# Patient Record
Sex: MALE | Race: WHITE | NOT HISPANIC OR LATINO | URBAN - METROPOLITAN AREA
[De-identification: names, ages, dates, MRNs, and addresses within clinical notes are randomized per-mention and may not be internally consistent; named-entity substitution may affect disease eponyms.]

---

## 2018-04-10 ENCOUNTER — RECORDS - HEALTHEAST (OUTPATIENT)
Dept: LAB | Facility: CLINIC | Age: 58
End: 2018-04-10

## 2018-04-10 ENCOUNTER — TRANSFERRED RECORDS (OUTPATIENT)
Dept: HEALTH INFORMATION MANAGEMENT | Facility: CLINIC | Age: 58
End: 2018-04-10

## 2018-04-10 LAB
CHOLEST SERPL-MCNC: 230 MG/DL
ERYTHROCYTE [SEDIMENTATION RATE] IN BLOOD BY WESTERGREN METHOD: 5 MM/HR (ref 0–15)
FASTING STATUS PATIENT QL REPORTED: ABNORMAL
HDLC SERPL-MCNC: 58 MG/DL
LDLC SERPL CALC-MCNC: 148 MG/DL
RHEUMATOID FACT SERPL-ACNC: <15 IU/ML (ref 0–30)
TRIGL SERPL-MCNC: 121 MG/DL
URATE SERPL-MCNC: 6.6 MG/DL (ref 3–8)

## 2018-04-11 LAB — B BURGDOR IGG+IGM SER QL: 0.02 INDEX VALUE

## 2018-04-12 LAB — ANA SER QL: 0.6 U

## 2018-08-10 ENCOUNTER — RECORDS - HEALTHEAST (OUTPATIENT)
Dept: LAB | Facility: CLINIC | Age: 58
End: 2018-08-10

## 2018-08-10 ENCOUNTER — TRANSFERRED RECORDS (OUTPATIENT)
Dept: HEALTH INFORMATION MANAGEMENT | Facility: CLINIC | Age: 58
End: 2018-08-10

## 2018-08-10 LAB
ALBUMIN SERPL-MCNC: 3.9 G/DL (ref 3.5–5)
ANION GAP SERPL CALCULATED.3IONS-SCNC: 10 MMOL/L (ref 5–18)
BUN SERPL-MCNC: 20 MG/DL (ref 8–22)
CALCIUM SERPL-MCNC: 9.4 MG/DL (ref 8.5–10.5)
CHLORIDE BLD-SCNC: 108 MMOL/L (ref 98–107)
CO2 SERPL-SCNC: 25 MMOL/L (ref 22–31)
CREAT SERPL-MCNC: 0.91 MG/DL (ref 0.7–1.3)
GFR SERPL CREATININE-BSD FRML MDRD: >60 ML/MIN/1.73M2
GLUCOSE BLD-MCNC: 96 MG/DL (ref 70–125)
PHOSPHATE SERPL-MCNC: 3.4 MG/DL (ref 2.5–4.5)
POTASSIUM BLD-SCNC: 4.4 MMOL/L (ref 3.5–5)
SODIUM SERPL-SCNC: 143 MMOL/L (ref 136–145)

## 2018-09-10 ENCOUNTER — RECORDS - HEALTHEAST (OUTPATIENT)
Dept: LAB | Facility: CLINIC | Age: 58
End: 2018-09-10

## 2018-09-10 LAB
C DIFF TOX B STL QL: NEGATIVE
RIBOTYPE 027/NAP1/BI: NORMAL

## 2018-09-11 LAB
O+P STL MICRO: NORMAL
SHIGA TOXIN 1: NEGATIVE
SHIGA TOXIN 2: NEGATIVE

## 2018-09-13 LAB — BACTERIA SPEC CULT: NORMAL

## 2018-10-08 ENCOUNTER — TELEPHONE (OUTPATIENT)
Dept: RHEUMATOLOGY | Facility: CLINIC | Age: 58
End: 2018-10-08

## 2018-10-08 NOTE — TELEPHONE ENCOUNTER
Health Call Center    Phone Message    May a detailed message be left on voicemail: no    Reason for Call:  See information below for online appointment request. PT DX of rheumatoid synovitis, tenosinovitis. Clinic will be facing all records regarding DX to clinic    REFERRAL REQUEST  Submitted Monday October 8th, 2018 @ 3:01 pm    REFERRING PHYSICIAN INFORMATION  Consult or referral?: Referral  Referring physicians name: Quinn Bruno  [facility] name: [facility]  UPIN: [empty]  Clinic address: [address] [place] [address] city: [facility] state: [place]  Clinic zip code: [address]  Clinic phone #: [phone]  Clinic fax #: [phone]  Clinic contact name: Aiyana  Clinic contact's direct #: [phone]    REQUESTED APPOINTMENT  Reason for appointment: rheumatoid synovitis, tenosinovitis  Speciality requested: rheumatology  Pertinent prior surgery or testing: [empty]  Onset / Duration: [empty]  Physician requested (if any): Dr Hasmukh Quiroga    Action Taken: Message routed to:  [facility] ([facility]): Rheumatology

## 2018-10-30 NOTE — TELEPHONE ENCOUNTER
Records have not been received. Spoke with Aliza at the medical records department who will fax the records to us.     General Rheumatology Intake Form    Reason for referral: 2nd opinion for RA  Referring provider: Quinn Bruno at [facility]    Past Rheumatologist: Yes  Clinic/Provider name: [facility] Last seen: still follows with currently    Is this a second opinion or transfer of care? 2nd opinion Are you wanting to establish care here? Patient is not sure as he lives part time in [place]  What is the reason that you do not want to go back to your previous Rheumatologist? NA     Have you been diagnosed with Fibromyalgia? no    Who manages your care for this issue now? [facility]     What is your most urgent concern at this time? Patient wants to make sure that he is on the right treatment plan    Have you seen any specialist related to the reason you are coming here? Yes, Rheumatologist    Where are we expecting records (labs, imaging or pathology) from? Care Everywhere needed from [facility], expecting records from [facility]. Spoke with Aliza at [facility] who stated that she will be faxing the records.     Offered an appointment on 1/11/2019 with Dr. Quiroga, patient accepted and was instructed to arrive 15 minutes prior to appointment and asked to bring a current medication list. Patient verbalized understanding. Appointment reminder mailed to patient.         Farzaneh Quispe CMA  10/30/2018 4:27 PM

## 2018-11-27 ENCOUNTER — DOCUMENTATION ONLY (OUTPATIENT)
Dept: RHEUMATOLOGY | Facility: CLINIC | Age: 58
End: 2018-11-27

## 2019-04-16 ENCOUNTER — RECORDS - HEALTHEAST (OUTPATIENT)
Dept: LAB | Facility: CLINIC | Age: 59
End: 2019-04-16

## 2019-04-16 LAB
ALBUMIN SERPL-MCNC: 4.2 G/DL (ref 3.5–5)
ALP SERPL-CCNC: 100 U/L (ref 45–120)
ALT SERPL W P-5'-P-CCNC: 45 U/L (ref 0–45)
ANION GAP SERPL CALCULATED.3IONS-SCNC: 12 MMOL/L (ref 5–18)
AST SERPL W P-5'-P-CCNC: 39 U/L (ref 0–40)
BILIRUB SERPL-MCNC: 1.6 MG/DL (ref 0–1)
BUN SERPL-MCNC: 14 MG/DL (ref 8–22)
CALCIUM SERPL-MCNC: 9.9 MG/DL (ref 8.5–10.5)
CHLORIDE BLD-SCNC: 101 MMOL/L (ref 98–107)
CO2 SERPL-SCNC: 28 MMOL/L (ref 22–31)
CREAT SERPL-MCNC: 0.9 MG/DL (ref 0.7–1.3)
GFR SERPL CREATININE-BSD FRML MDRD: >60 ML/MIN/1.73M2
GLUCOSE BLD-MCNC: 93 MG/DL (ref 70–125)
LIPASE SERPL-CCNC: 21 U/L (ref 0–52)
POTASSIUM BLD-SCNC: 4.2 MMOL/L (ref 3.5–5)
PROT SERPL-MCNC: 6.9 G/DL (ref 6–8)
SODIUM SERPL-SCNC: 141 MMOL/L (ref 136–145)

## 2021-05-30 ENCOUNTER — RECORDS - HEALTHEAST (OUTPATIENT)
Dept: ADMINISTRATIVE | Facility: CLINIC | Age: 61
End: 2021-05-30

## 2021-06-01 ENCOUNTER — RECORDS - HEALTHEAST (OUTPATIENT)
Dept: ADMINISTRATIVE | Facility: CLINIC | Age: 61
End: 2021-06-01

## 2021-06-02 ENCOUNTER — RECORDS - HEALTHEAST (OUTPATIENT)
Dept: ADMINISTRATIVE | Facility: CLINIC | Age: 61
End: 2021-06-02

## 2024-09-03 ENCOUNTER — MEDICAL CORRESPONDENCE (OUTPATIENT)
Dept: HEALTH INFORMATION MANAGEMENT | Facility: CLINIC | Age: 64
End: 2024-09-03

## 2024-12-10 ENCOUNTER — MEDICAL CORRESPONDENCE (OUTPATIENT)
Dept: HEALTH INFORMATION MANAGEMENT | Facility: CLINIC | Age: 64
End: 2024-12-10

## 2025-03-31 ENCOUNTER — TRANSFERRED RECORDS (OUTPATIENT)
Dept: HEALTH INFORMATION MANAGEMENT | Facility: CLINIC | Age: 65
End: 2025-03-31

## 2025-03-31 ENCOUNTER — MEDICAL CORRESPONDENCE (OUTPATIENT)
Dept: HEALTH INFORMATION MANAGEMENT | Facility: CLINIC | Age: 65
End: 2025-03-31

## 2025-05-02 ENCOUNTER — MEDICAL CORRESPONDENCE (OUTPATIENT)
Dept: HEALTH INFORMATION MANAGEMENT | Facility: CLINIC | Age: 65
End: 2025-05-02
Payer: COMMERCIAL